# Patient Record
Sex: FEMALE | Race: WHITE | NOT HISPANIC OR LATINO
[De-identification: names, ages, dates, MRNs, and addresses within clinical notes are randomized per-mention and may not be internally consistent; named-entity substitution may affect disease eponyms.]

---

## 2022-07-22 ENCOUNTER — APPOINTMENT (OUTPATIENT)
Dept: ANTEPARTUM | Facility: CLINIC | Age: 27
End: 2022-07-22

## 2022-07-22 ENCOUNTER — ASOB RESULT (OUTPATIENT)
Age: 27
End: 2022-07-22

## 2022-07-22 PROCEDURE — 76813 OB US NUCHAL MEAS 1 GEST: CPT

## 2022-07-22 PROCEDURE — 76801 OB US < 14 WKS SINGLE FETUS: CPT

## 2022-08-11 PROBLEM — Z00.00 ENCOUNTER FOR PREVENTIVE HEALTH EXAMINATION: Status: ACTIVE | Noted: 2022-08-11

## 2022-09-16 ENCOUNTER — APPOINTMENT (OUTPATIENT)
Dept: ANTEPARTUM | Facility: CLINIC | Age: 27
End: 2022-09-16

## 2022-09-16 ENCOUNTER — ASOB RESULT (OUTPATIENT)
Age: 27
End: 2022-09-16

## 2022-09-16 PROCEDURE — 76817 TRANSVAGINAL US OBSTETRIC: CPT

## 2022-09-16 PROCEDURE — 76805 OB US >/= 14 WKS SNGL FETUS: CPT

## 2022-09-30 ENCOUNTER — APPOINTMENT (OUTPATIENT)
Dept: ANTEPARTUM | Facility: CLINIC | Age: 27
End: 2022-09-30

## 2022-09-30 ENCOUNTER — ASOB RESULT (OUTPATIENT)
Age: 27
End: 2022-09-30

## 2022-09-30 PROCEDURE — 76816 OB US FOLLOW-UP PER FETUS: CPT

## 2022-10-07 ENCOUNTER — APPOINTMENT (OUTPATIENT)
Dept: ANTEPARTUM | Facility: CLINIC | Age: 27
End: 2022-10-07

## 2022-10-20 ENCOUNTER — ASOB RESULT (OUTPATIENT)
Age: 27
End: 2022-10-20

## 2022-10-20 ENCOUNTER — APPOINTMENT (OUTPATIENT)
Dept: ANTEPARTUM | Facility: CLINIC | Age: 27
End: 2022-10-20

## 2022-10-20 PROCEDURE — 76815 OB US LIMITED FETUS(S): CPT

## 2022-11-23 ENCOUNTER — APPOINTMENT (OUTPATIENT)
Dept: ANTEPARTUM | Facility: CLINIC | Age: 27
End: 2022-11-23

## 2022-11-23 ENCOUNTER — ASOB RESULT (OUTPATIENT)
Age: 27
End: 2022-11-23

## 2022-11-23 PROCEDURE — 76820 UMBILICAL ARTERY ECHO: CPT | Mod: 59

## 2022-11-23 PROCEDURE — 76816 OB US FOLLOW-UP PER FETUS: CPT

## 2022-11-23 PROCEDURE — 76819 FETAL BIOPHYS PROFIL W/O NST: CPT

## 2022-12-07 ENCOUNTER — APPOINTMENT (OUTPATIENT)
Dept: ANTEPARTUM | Facility: CLINIC | Age: 27
End: 2022-12-07

## 2022-12-07 ENCOUNTER — ASOB RESULT (OUTPATIENT)
Age: 27
End: 2022-12-07

## 2022-12-07 PROCEDURE — 76820 UMBILICAL ARTERY ECHO: CPT | Mod: 59

## 2022-12-07 PROCEDURE — 76816 OB US FOLLOW-UP PER FETUS: CPT

## 2022-12-07 PROCEDURE — 76819 FETAL BIOPHYS PROFIL W/O NST: CPT

## 2022-12-14 ENCOUNTER — APPOINTMENT (OUTPATIENT)
Dept: ANTEPARTUM | Facility: CLINIC | Age: 27
End: 2022-12-14

## 2022-12-14 ENCOUNTER — ASOB RESULT (OUTPATIENT)
Age: 27
End: 2022-12-14

## 2022-12-14 PROCEDURE — 76819 FETAL BIOPHYS PROFIL W/O NST: CPT

## 2022-12-14 PROCEDURE — 76820 UMBILICAL ARTERY ECHO: CPT

## 2022-12-21 ENCOUNTER — ASOB RESULT (OUTPATIENT)
Age: 27
End: 2022-12-21

## 2022-12-21 ENCOUNTER — APPOINTMENT (OUTPATIENT)
Dept: ANTEPARTUM | Facility: CLINIC | Age: 27
End: 2022-12-21

## 2022-12-21 PROCEDURE — 76816 OB US FOLLOW-UP PER FETUS: CPT

## 2022-12-21 PROCEDURE — 76820 UMBILICAL ARTERY ECHO: CPT | Mod: 59

## 2022-12-21 PROCEDURE — 76819 FETAL BIOPHYS PROFIL W/O NST: CPT

## 2022-12-29 ENCOUNTER — APPOINTMENT (OUTPATIENT)
Dept: ANTEPARTUM | Facility: CLINIC | Age: 27
End: 2022-12-29
Payer: COMMERCIAL

## 2022-12-29 ENCOUNTER — ASOB RESULT (OUTPATIENT)
Age: 27
End: 2022-12-29

## 2022-12-29 PROCEDURE — 76820 UMBILICAL ARTERY ECHO: CPT

## 2022-12-29 PROCEDURE — 76819 FETAL BIOPHYS PROFIL W/O NST: CPT

## 2023-01-03 ENCOUNTER — ASOB RESULT (OUTPATIENT)
Age: 28
End: 2023-01-03

## 2023-01-03 ENCOUNTER — APPOINTMENT (OUTPATIENT)
Dept: ANTEPARTUM | Facility: CLINIC | Age: 28
End: 2023-01-03
Payer: COMMERCIAL

## 2023-01-03 PROCEDURE — 76820 UMBILICAL ARTERY ECHO: CPT | Mod: 59

## 2023-01-03 PROCEDURE — 76818 FETAL BIOPHYS PROFILE W/NST: CPT

## 2023-01-03 PROCEDURE — 76816 OB US FOLLOW-UP PER FETUS: CPT

## 2023-01-05 ENCOUNTER — ASOB RESULT (OUTPATIENT)
Age: 28
End: 2023-01-05

## 2023-01-05 ENCOUNTER — APPOINTMENT (OUTPATIENT)
Dept: ANTEPARTUM | Facility: CLINIC | Age: 28
End: 2023-01-05
Payer: COMMERCIAL

## 2023-01-05 PROCEDURE — 76820 UMBILICAL ARTERY ECHO: CPT

## 2023-01-05 PROCEDURE — 76818 FETAL BIOPHYS PROFILE W/NST: CPT

## 2023-01-11 ENCOUNTER — ASOB RESULT (OUTPATIENT)
Age: 28
End: 2023-01-11

## 2023-01-11 ENCOUNTER — APPOINTMENT (OUTPATIENT)
Dept: ANTEPARTUM | Facility: CLINIC | Age: 28
End: 2023-01-11
Payer: COMMERCIAL

## 2023-01-11 PROCEDURE — 76818 FETAL BIOPHYS PROFILE W/NST: CPT

## 2023-01-11 PROCEDURE — 76820 UMBILICAL ARTERY ECHO: CPT

## 2023-01-13 ENCOUNTER — APPOINTMENT (OUTPATIENT)
Dept: ANTEPARTUM | Facility: CLINIC | Age: 28
End: 2023-01-13

## 2023-01-13 ENCOUNTER — OUTPATIENT (OUTPATIENT)
Dept: OUTPATIENT SERVICES | Facility: HOSPITAL | Age: 28
LOS: 1 days | End: 2023-01-13
Payer: COMMERCIAL

## 2023-01-13 DIAGNOSIS — Z01.818 ENCOUNTER FOR OTHER PREPROCEDURAL EXAMINATION: ICD-10-CM

## 2023-01-13 LAB
ANION GAP SERPL CALC-SCNC: 10 MMOL/L — SIGNIFICANT CHANGE UP (ref 5–17)
APTT BLD: 27.3 SEC — LOW (ref 27.5–35.5)
BASOPHILS # BLD AUTO: 0.02 K/UL — SIGNIFICANT CHANGE UP (ref 0–0.2)
BASOPHILS NFR BLD AUTO: 0.2 % — SIGNIFICANT CHANGE UP (ref 0–2)
BLD GP AB SCN SERPL QL: NEGATIVE — SIGNIFICANT CHANGE UP
BLD GP AB SCN SERPL QL: NEGATIVE — SIGNIFICANT CHANGE UP
BUN SERPL-MCNC: 7 MG/DL — SIGNIFICANT CHANGE UP (ref 7–23)
CALCIUM SERPL-MCNC: 9.4 MG/DL — SIGNIFICANT CHANGE UP (ref 8.4–10.5)
CHLORIDE SERPL-SCNC: 102 MMOL/L — SIGNIFICANT CHANGE UP (ref 96–108)
CO2 SERPL-SCNC: 24 MMOL/L — SIGNIFICANT CHANGE UP (ref 22–31)
CREAT SERPL-MCNC: 0.53 MG/DL — SIGNIFICANT CHANGE UP (ref 0.5–1.3)
EGFR: 130 ML/MIN/1.73M2 — SIGNIFICANT CHANGE UP
EOSINOPHIL # BLD AUTO: 0.08 K/UL — SIGNIFICANT CHANGE UP (ref 0–0.5)
EOSINOPHIL NFR BLD AUTO: 0.7 % — SIGNIFICANT CHANGE UP (ref 0–6)
GLUCOSE SERPL-MCNC: 131 MG/DL — HIGH (ref 70–99)
HCT VFR BLD CALC: 36.1 % — SIGNIFICANT CHANGE UP (ref 34.5–45)
HGB BLD-MCNC: 11.8 G/DL — SIGNIFICANT CHANGE UP (ref 11.5–15.5)
IMM GRANULOCYTES NFR BLD AUTO: 0.4 % — SIGNIFICANT CHANGE UP (ref 0–0.9)
INR BLD: 0.96 — SIGNIFICANT CHANGE UP (ref 0.88–1.16)
LYMPHOCYTES # BLD AUTO: 27.5 % — SIGNIFICANT CHANGE UP (ref 13–44)
LYMPHOCYTES # BLD AUTO: 3.26 K/UL — SIGNIFICANT CHANGE UP (ref 1–3.3)
MCHC RBC-ENTMCNC: 28.8 PG — SIGNIFICANT CHANGE UP (ref 27–34)
MCHC RBC-ENTMCNC: 32.7 GM/DL — SIGNIFICANT CHANGE UP (ref 32–36)
MCV RBC AUTO: 88 FL — SIGNIFICANT CHANGE UP (ref 80–100)
MONOCYTES # BLD AUTO: 0.53 K/UL — SIGNIFICANT CHANGE UP (ref 0–0.9)
MONOCYTES NFR BLD AUTO: 4.5 % — SIGNIFICANT CHANGE UP (ref 2–14)
NEUTROPHILS # BLD AUTO: 7.92 K/UL — HIGH (ref 1.8–7.4)
NEUTROPHILS NFR BLD AUTO: 66.7 % — SIGNIFICANT CHANGE UP (ref 43–77)
NRBC # BLD: 0 /100 WBCS — SIGNIFICANT CHANGE UP (ref 0–0)
PLATELET # BLD AUTO: 272 K/UL — SIGNIFICANT CHANGE UP (ref 150–400)
POTASSIUM SERPL-MCNC: 4.4 MMOL/L — SIGNIFICANT CHANGE UP (ref 3.5–5.3)
POTASSIUM SERPL-SCNC: 4.4 MMOL/L — SIGNIFICANT CHANGE UP (ref 3.5–5.3)
PROTHROM AB SERPL-ACNC: 11.4 SEC — SIGNIFICANT CHANGE UP (ref 10.5–13.4)
RBC # BLD: 4.1 M/UL — SIGNIFICANT CHANGE UP (ref 3.8–5.2)
RBC # FLD: 14.5 % — SIGNIFICANT CHANGE UP (ref 10.3–14.5)
RH IG SCN BLD-IMP: POSITIVE — SIGNIFICANT CHANGE UP
RH IG SCN BLD-IMP: POSITIVE — SIGNIFICANT CHANGE UP
SODIUM SERPL-SCNC: 136 MMOL/L — SIGNIFICANT CHANGE UP (ref 135–145)
WBC # BLD: 11.86 K/UL — HIGH (ref 3.8–10.5)
WBC # FLD AUTO: 11.86 K/UL — HIGH (ref 3.8–10.5)

## 2023-01-13 PROCEDURE — 85025 COMPLETE CBC W/AUTO DIFF WBC: CPT

## 2023-01-13 PROCEDURE — 85610 PROTHROMBIN TIME: CPT

## 2023-01-13 PROCEDURE — 80048 BASIC METABOLIC PNL TOTAL CA: CPT

## 2023-01-13 PROCEDURE — 86900 BLOOD TYPING SEROLOGIC ABO: CPT

## 2023-01-13 PROCEDURE — 86850 RBC ANTIBODY SCREEN: CPT

## 2023-01-13 PROCEDURE — 86901 BLOOD TYPING SEROLOGIC RH(D): CPT

## 2023-01-13 PROCEDURE — 85730 THROMBOPLASTIN TIME PARTIAL: CPT

## 2023-01-15 ENCOUNTER — TRANSCRIPTION ENCOUNTER (OUTPATIENT)
Age: 28
End: 2023-01-15

## 2023-01-16 ENCOUNTER — INPATIENT (INPATIENT)
Facility: HOSPITAL | Age: 28
LOS: 3 days | Discharge: HOME | End: 2023-01-20
Attending: SPECIALIST | Admitting: SPECIALIST
Payer: COMMERCIAL

## 2023-01-16 VITALS — WEIGHT: 184.97 LBS | HEIGHT: 59 IN

## 2023-01-16 LAB
ALBUMIN SERPL ELPH-MCNC: 2.7 G/DL — LOW (ref 3.3–5)
ALP SERPL-CCNC: 75 U/L — SIGNIFICANT CHANGE UP (ref 40–120)
ALT FLD-CCNC: 8 U/L — LOW (ref 10–45)
ANION GAP SERPL CALC-SCNC: 13 MMOL/L — SIGNIFICANT CHANGE UP (ref 5–17)
AST SERPL-CCNC: 23 U/L — SIGNIFICANT CHANGE UP (ref 10–40)
BILIRUB SERPL-MCNC: <0.2 MG/DL — SIGNIFICANT CHANGE UP (ref 0.2–1.2)
BLD GP AB SCN SERPL QL: NEGATIVE — SIGNIFICANT CHANGE UP
BUN SERPL-MCNC: 6 MG/DL — LOW (ref 7–23)
CALCIUM SERPL-MCNC: 8.4 MG/DL — SIGNIFICANT CHANGE UP (ref 8.4–10.5)
CHLORIDE SERPL-SCNC: 103 MMOL/L — SIGNIFICANT CHANGE UP (ref 96–108)
CO2 SERPL-SCNC: 20 MMOL/L — LOW (ref 22–31)
CREAT ?TM UR-MCNC: 224 MG/DL — SIGNIFICANT CHANGE UP
CREAT SERPL-MCNC: 0.43 MG/DL — LOW (ref 0.5–1.3)
EGFR: 137 ML/MIN/1.73M2 — SIGNIFICANT CHANGE UP
GLUCOSE BLDC GLUCOMTR-MCNC: 100 MG/DL — HIGH (ref 70–99)
GLUCOSE SERPL-MCNC: 121 MG/DL — HIGH (ref 70–99)
LDH SERPL L TO P-CCNC: 272 U/L — HIGH (ref 50–242)
POTASSIUM SERPL-MCNC: 4.5 MMOL/L — SIGNIFICANT CHANGE UP (ref 3.5–5.3)
POTASSIUM SERPL-SCNC: 4.5 MMOL/L — SIGNIFICANT CHANGE UP (ref 3.5–5.3)
PROT ?TM UR-MCNC: 106 MG/DL — HIGH (ref 0–12)
PROT SERPL-MCNC: 4.8 G/DL — LOW (ref 6–8.3)
PROT/CREAT UR-RTO: 0.5 RATIO — HIGH (ref 0–0.2)
RH IG SCN BLD-IMP: POSITIVE — SIGNIFICANT CHANGE UP
SODIUM SERPL-SCNC: 136 MMOL/L — SIGNIFICANT CHANGE UP (ref 135–145)
URATE SERPL-MCNC: 4.1 MG/DL — SIGNIFICANT CHANGE UP (ref 2.5–7)

## 2023-01-16 PROCEDURE — 88307 TISSUE EXAM BY PATHOLOGIST: CPT | Mod: 26

## 2023-01-16 PROCEDURE — 59514 CESAREAN DELIVERY ONLY: CPT | Mod: AS

## 2023-01-16 RX ORDER — KETOROLAC TROMETHAMINE 30 MG/ML
30 SYRINGE (ML) INJECTION EVERY 6 HOURS
Refills: 0 | Status: DISCONTINUED | OUTPATIENT
Start: 2023-01-16 | End: 2023-01-16

## 2023-01-16 RX ORDER — SODIUM CHLORIDE 9 MG/ML
500 INJECTION, SOLUTION INTRAVENOUS ONCE
Refills: 0 | Status: COMPLETED | OUTPATIENT
Start: 2023-01-16 | End: 2023-01-16

## 2023-01-16 RX ORDER — SIMETHICONE 80 MG/1
80 TABLET, CHEWABLE ORAL EVERY 4 HOURS
Refills: 0 | Status: DISCONTINUED | OUTPATIENT
Start: 2023-01-16 | End: 2023-01-20

## 2023-01-16 RX ORDER — CITRIC ACID/SODIUM CITRATE 300-500 MG
30 SOLUTION, ORAL ORAL ONCE
Refills: 0 | Status: COMPLETED | OUTPATIENT
Start: 2023-01-16 | End: 2023-01-16

## 2023-01-16 RX ORDER — ENOXAPARIN SODIUM 100 MG/ML
40 INJECTION SUBCUTANEOUS EVERY 24 HOURS
Refills: 0 | Status: DISCONTINUED | OUTPATIENT
Start: 2023-01-17 | End: 2023-01-20

## 2023-01-16 RX ORDER — CEFAZOLIN SODIUM 1 G
2000 VIAL (EA) INJECTION ONCE
Refills: 0 | Status: COMPLETED | OUTPATIENT
Start: 2023-01-16 | End: 2023-01-16

## 2023-01-16 RX ORDER — OXYTOCIN 10 UNIT/ML
333.33 VIAL (ML) INJECTION
Qty: 20 | Refills: 0 | Status: DISCONTINUED | OUTPATIENT
Start: 2023-01-16 | End: 2023-01-16

## 2023-01-16 RX ORDER — SODIUM CHLORIDE 9 MG/ML
1000 INJECTION, SOLUTION INTRAVENOUS
Refills: 0 | Status: DISCONTINUED | OUTPATIENT
Start: 2023-01-16 | End: 2023-01-16

## 2023-01-16 RX ORDER — OXYTOCIN 10 UNIT/ML
333.33 VIAL (ML) INJECTION
Qty: 20 | Refills: 0 | Status: DISCONTINUED | OUTPATIENT
Start: 2023-01-16 | End: 2023-01-20

## 2023-01-16 RX ORDER — TETANUS TOXOID, REDUCED DIPHTHERIA TOXOID AND ACELLULAR PERTUSSIS VACCINE, ADSORBED 5; 2.5; 8; 8; 2.5 [IU]/.5ML; [IU]/.5ML; UG/.5ML; UG/.5ML; UG/.5ML
0.5 SUSPENSION INTRAMUSCULAR ONCE
Refills: 0 | Status: DISCONTINUED | OUTPATIENT
Start: 2023-01-16 | End: 2023-01-20

## 2023-01-16 RX ORDER — FAMOTIDINE 10 MG/ML
20 INJECTION INTRAVENOUS ONCE
Refills: 0 | Status: COMPLETED | OUTPATIENT
Start: 2023-01-16 | End: 2023-01-16

## 2023-01-16 RX ORDER — DIPHENHYDRAMINE HCL 50 MG
25 CAPSULE ORAL EVERY 6 HOURS
Refills: 0 | Status: DISCONTINUED | OUTPATIENT
Start: 2023-01-16 | End: 2023-01-20

## 2023-01-16 RX ORDER — ONDANSETRON 8 MG/1
4 TABLET, FILM COATED ORAL EVERY 6 HOURS
Refills: 0 | Status: DISCONTINUED | OUTPATIENT
Start: 2023-01-16 | End: 2023-01-16

## 2023-01-16 RX ORDER — INFLUENZA VIRUS VACCINE 15; 15; 15; 15 UG/.5ML; UG/.5ML; UG/.5ML; UG/.5ML
0.5 SUSPENSION INTRAMUSCULAR ONCE
Refills: 0 | Status: DISCONTINUED | OUTPATIENT
Start: 2023-01-16 | End: 2023-01-20

## 2023-01-16 RX ORDER — SODIUM CHLORIDE 9 MG/ML
1000 INJECTION, SOLUTION INTRAVENOUS
Refills: 0 | Status: DISCONTINUED | OUTPATIENT
Start: 2023-01-16 | End: 2023-01-20

## 2023-01-16 RX ORDER — IBUPROFEN 200 MG
600 TABLET ORAL EVERY 6 HOURS
Refills: 0 | Status: DISCONTINUED | OUTPATIENT
Start: 2023-01-16 | End: 2023-01-16

## 2023-01-16 RX ORDER — FAMOTIDINE 10 MG/ML
20 INJECTION INTRAVENOUS ONCE
Refills: 0 | Status: DISCONTINUED | OUTPATIENT
Start: 2023-01-16 | End: 2023-01-16

## 2023-01-16 RX ORDER — OXYCODONE HYDROCHLORIDE 5 MG/1
5 TABLET ORAL ONCE
Refills: 0 | Status: DISCONTINUED | OUTPATIENT
Start: 2023-01-16 | End: 2023-01-20

## 2023-01-16 RX ORDER — CITRIC ACID/SODIUM CITRATE 300-500 MG
30 SOLUTION, ORAL ORAL ONCE
Refills: 0 | Status: DISCONTINUED | OUTPATIENT
Start: 2023-01-16 | End: 2023-01-16

## 2023-01-16 RX ORDER — MAGNESIUM HYDROXIDE 400 MG/1
30 TABLET, CHEWABLE ORAL
Refills: 0 | Status: DISCONTINUED | OUTPATIENT
Start: 2023-01-16 | End: 2023-01-20

## 2023-01-16 RX ORDER — ACETAMINOPHEN 500 MG
1000 TABLET ORAL ONCE
Refills: 0 | Status: COMPLETED | OUTPATIENT
Start: 2023-01-16 | End: 2023-01-16

## 2023-01-16 RX ORDER — SODIUM CHLORIDE 9 MG/ML
1000 INJECTION, SOLUTION INTRAVENOUS ONCE
Refills: 0 | Status: DISCONTINUED | OUTPATIENT
Start: 2023-01-16 | End: 2023-01-16

## 2023-01-16 RX ORDER — NALOXONE HYDROCHLORIDE 4 MG/.1ML
0.1 SPRAY NASAL
Refills: 0 | Status: DISCONTINUED | OUTPATIENT
Start: 2023-01-16 | End: 2023-01-16

## 2023-01-16 RX ORDER — LANOLIN
1 OINTMENT (GRAM) TOPICAL EVERY 6 HOURS
Refills: 0 | Status: DISCONTINUED | OUTPATIENT
Start: 2023-01-16 | End: 2023-01-20

## 2023-01-16 RX ORDER — ACETAMINOPHEN 500 MG
975 TABLET ORAL
Refills: 0 | Status: DISCONTINUED | OUTPATIENT
Start: 2023-01-16 | End: 2023-01-20

## 2023-01-16 RX ORDER — DEXAMETHASONE 0.5 MG/5ML
4 ELIXIR ORAL EVERY 6 HOURS
Refills: 0 | Status: DISCONTINUED | OUTPATIENT
Start: 2023-01-16 | End: 2023-01-16

## 2023-01-16 RX ORDER — OXYCODONE HYDROCHLORIDE 5 MG/1
5 TABLET ORAL
Refills: 0 | Status: COMPLETED | OUTPATIENT
Start: 2023-01-16 | End: 2023-01-23

## 2023-01-16 RX ADMIN — FAMOTIDINE 20 MILLIGRAM(S): 10 INJECTION INTRAVENOUS at 07:29

## 2023-01-16 RX ADMIN — Medication 975 MILLIGRAM(S): at 16:30

## 2023-01-16 RX ADMIN — Medication 975 MILLIGRAM(S): at 20:48

## 2023-01-16 RX ADMIN — SIMETHICONE 80 MILLIGRAM(S): 80 TABLET, CHEWABLE ORAL at 23:17

## 2023-01-16 RX ADMIN — Medication 400 MILLIGRAM(S): at 10:23

## 2023-01-16 RX ADMIN — Medication 30 MILLILITER(S): at 07:29

## 2023-01-16 RX ADMIN — Medication 100 MILLIGRAM(S): at 07:30

## 2023-01-16 RX ADMIN — SODIUM CHLORIDE 500 MILLILITER(S): 9 INJECTION, SOLUTION INTRAVENOUS at 10:05

## 2023-01-16 RX ADMIN — Medication 975 MILLIGRAM(S): at 21:20

## 2023-01-16 RX ADMIN — Medication 975 MILLIGRAM(S): at 15:52

## 2023-01-16 RX ADMIN — Medication 1000 MILLIGRAM(S): at 11:15

## 2023-01-16 NOTE — PRE-ANESTHESIA EVALUATION ADULT - NSANTHRISKNONERD_GEN_ALL_CORE
[FreeTextEntry3] : AZIZA pushes up on elbows, good head control, rolled in office front to back, babbling, social smile, interactive\par 
No risk alerts present

## 2023-01-16 NOTE — PATIENT PROFILE OB - FALL HARM RISK - UNIVERSAL INTERVENTIONS
Bed in lowest position, wheels locked, appropriate side rails in place/Call bell, personal items and telephone in reach/Instruct patient to call for assistance before getting out of bed or chair/Non-slip footwear when patient is out of bed/[place] to call system/Physically safe environment - no spills, clutter or unnecessary equipment/Purposeful Proactive Rounding/Room/bathroom lighting operational, light cord in reach

## 2023-01-17 LAB
BASOPHILS # BLD AUTO: 0.03 K/UL — SIGNIFICANT CHANGE UP (ref 0–0.2)
BASOPHILS NFR BLD AUTO: 0.2 % — SIGNIFICANT CHANGE UP (ref 0–2)
EOSINOPHIL # BLD AUTO: 0.05 K/UL — SIGNIFICANT CHANGE UP (ref 0–0.5)
EOSINOPHIL NFR BLD AUTO: 0.4 % — SIGNIFICANT CHANGE UP (ref 0–6)
HCT VFR BLD CALC: 28.2 % — LOW (ref 34.5–45)
HGB BLD-MCNC: 9 G/DL — LOW (ref 11.5–15.5)
IMM GRANULOCYTES NFR BLD AUTO: 0.5 % — SIGNIFICANT CHANGE UP (ref 0–0.9)
LYMPHOCYTES # BLD AUTO: 2.91 K/UL — SIGNIFICANT CHANGE UP (ref 1–3.3)
LYMPHOCYTES # BLD AUTO: 21.6 % — SIGNIFICANT CHANGE UP (ref 13–44)
MCHC RBC-ENTMCNC: 28.5 PG — SIGNIFICANT CHANGE UP (ref 27–34)
MCHC RBC-ENTMCNC: 31.9 GM/DL — LOW (ref 32–36)
MCV RBC AUTO: 89.2 FL — SIGNIFICANT CHANGE UP (ref 80–100)
MONOCYTES # BLD AUTO: 0.67 K/UL — SIGNIFICANT CHANGE UP (ref 0–0.9)
MONOCYTES NFR BLD AUTO: 5 % — SIGNIFICANT CHANGE UP (ref 2–14)
NEUTROPHILS # BLD AUTO: 9.77 K/UL — HIGH (ref 1.8–7.4)
NEUTROPHILS NFR BLD AUTO: 72.3 % — SIGNIFICANT CHANGE UP (ref 43–77)
NRBC # BLD: 0 /100 WBCS — SIGNIFICANT CHANGE UP (ref 0–0)
PLATELET # BLD AUTO: 212 K/UL — SIGNIFICANT CHANGE UP (ref 150–400)
RBC # BLD: 3.16 M/UL — LOW (ref 3.8–5.2)
RBC # FLD: 14.6 % — HIGH (ref 10.3–14.5)
WBC # BLD: 13.5 K/UL — HIGH (ref 3.8–10.5)
WBC # FLD AUTO: 13.5 K/UL — HIGH (ref 3.8–10.5)

## 2023-01-17 RX ORDER — SENNA PLUS 8.6 MG/1
1 TABLET ORAL DAILY
Refills: 0 | Status: DISCONTINUED | OUTPATIENT
Start: 2023-01-17 | End: 2023-01-20

## 2023-01-17 RX ORDER — OXYCODONE HYDROCHLORIDE 5 MG/1
5 TABLET ORAL
Refills: 0 | Status: DISCONTINUED | OUTPATIENT
Start: 2023-01-17 | End: 2023-01-20

## 2023-01-17 RX ADMIN — OXYCODONE HYDROCHLORIDE 5 MILLIGRAM(S): 5 TABLET ORAL at 19:00

## 2023-01-17 RX ADMIN — Medication 975 MILLIGRAM(S): at 03:00

## 2023-01-17 RX ADMIN — Medication 975 MILLIGRAM(S): at 10:00

## 2023-01-17 RX ADMIN — SIMETHICONE 80 MILLIGRAM(S): 80 TABLET, CHEWABLE ORAL at 12:32

## 2023-01-17 RX ADMIN — SIMETHICONE 80 MILLIGRAM(S): 80 TABLET, CHEWABLE ORAL at 06:43

## 2023-01-17 RX ADMIN — Medication 975 MILLIGRAM(S): at 21:31

## 2023-01-17 RX ADMIN — OXYCODONE HYDROCHLORIDE 5 MILLIGRAM(S): 5 TABLET ORAL at 13:21

## 2023-01-17 RX ADMIN — OXYCODONE HYDROCHLORIDE 5 MILLIGRAM(S): 5 TABLET ORAL at 03:30

## 2023-01-17 RX ADMIN — OXYCODONE HYDROCHLORIDE 5 MILLIGRAM(S): 5 TABLET ORAL at 18:13

## 2023-01-17 RX ADMIN — OXYCODONE HYDROCHLORIDE 5 MILLIGRAM(S): 5 TABLET ORAL at 06:39

## 2023-01-17 RX ADMIN — SENNA PLUS 1 TABLET(S): 8.6 TABLET ORAL at 23:28

## 2023-01-17 RX ADMIN — Medication 975 MILLIGRAM(S): at 16:00

## 2023-01-17 RX ADMIN — Medication 975 MILLIGRAM(S): at 15:25

## 2023-01-17 RX ADMIN — Medication 975 MILLIGRAM(S): at 22:05

## 2023-01-17 RX ADMIN — OXYCODONE HYDROCHLORIDE 5 MILLIGRAM(S): 5 TABLET ORAL at 07:14

## 2023-01-17 RX ADMIN — Medication 975 MILLIGRAM(S): at 09:23

## 2023-01-17 RX ADMIN — OXYCODONE HYDROCHLORIDE 5 MILLIGRAM(S): 5 TABLET ORAL at 23:28

## 2023-01-17 RX ADMIN — OXYCODONE HYDROCHLORIDE 5 MILLIGRAM(S): 5 TABLET ORAL at 04:00

## 2023-01-17 RX ADMIN — Medication 975 MILLIGRAM(S): at 02:23

## 2023-01-17 RX ADMIN — OXYCODONE HYDROCHLORIDE 5 MILLIGRAM(S): 5 TABLET ORAL at 12:24

## 2023-01-17 RX ADMIN — SIMETHICONE 80 MILLIGRAM(S): 80 TABLET, CHEWABLE ORAL at 18:12

## 2023-01-17 RX ADMIN — ENOXAPARIN SODIUM 40 MILLIGRAM(S): 100 INJECTION SUBCUTANEOUS at 09:23

## 2023-01-17 NOTE — PROGRESS NOTE ADULT - SUBJECTIVE AND OBJECTIVE BOX
Patient evaluated at bedside this morning on POD#1, resting comfortable in bed with no acute events overnight.  She reports pain is well controlled with acetaminophen and IV Toradol.  She denies headache, dizziness, chest pain, palpitations, shortness of breath, nausea, vomiting, or heavy vaginal bleeding.  She has ambulated to the bathroom and is voiding spontaneously.  Tolerating clear liquids, not passing flatus.     Physical Exam:  Vital Signs Last 24 Hrs  T(C): 36.8 (17 Jan 2023 06:32), Max: 37.1 (16 Jan 2023 22:18)  T(F): 98.2 (17 Jan 2023 06:32), Max: 98.8 (16 Jan 2023 22:18)  HR: 103 (17 Jan 2023 06:32) (79 - 103)  BP: 123/74 (17 Jan 2023 06:32) (105/63 - 174/82)  BP(mean): --  RR: 18 (17 Jan 2023 06:32) (18 - 18)  SpO2: 99% (17 Jan 2023 06:32) (98% - 99%)    Parameters below as of 17 Jan 2023 06:32  Patient On (Oxygen Delivery Method): room air        GA: comfortable in NAD  Abd: soft, nontender, nondistended, no rebound or guarding, incision clean, dry and intact, uterus firm at midline, 1 fb below umbilicus  : lochia WNL  Extremities: no swelling or calf tenderness                            9.0    13.50 )-----------( 212      ( 17 Jan 2023 05:30 )             28.2     01-16    136  |  103  |  6<L>  ----------------------------<  121<H>  4.5   |  20<L>  |  0.43<L>    Ca    8.4      16 Jan 2023 19:19    TPro  4.8<L>  /  Alb  2.7<L>  /  TBili  <0.2  /  DBili  x   /  AST  23  /  ALT  8<L>  /  AlkPhos  75  01-16        acetaminophen     Tablet .. 975 milliGRAM(s) Oral <User Schedule>  diphenhydrAMINE 25 milliGRAM(s) Oral every 6 hours PRN  diphtheria/tetanus/pertussis (acellular) Vaccine (Adacel) 0.5 milliLiter(s) IntraMuscular once  enoxaparin Injectable 40 milliGRAM(s) SubCutaneous every 24 hours  influenza   Vaccine 0.5 milliLiter(s) IntraMuscular once  lactated ringers. 1000 milliLiter(s) IV Continuous <Continuous>  lanolin Ointment 1 Application(s) Topical every 6 hours PRN  magnesium hydroxide Suspension 30 milliLiter(s) Oral two times a day PRN  oxyCODONE    IR 5 milliGRAM(s) Oral once PRN  oxyCODONE    IR 5 milliGRAM(s) Oral every 3 hours PRN  oxytocin Infusion 333.333 milliUNIT(s)/Min IV Continuous <Continuous>  simethicone 80 milliGRAM(s) Chew every 4 hours PRN

## 2023-01-17 NOTE — LACTATION INITIAL EVALUATION - NS LACT CON REASON FOR REQ
Dyad seen at 16hrs post birth. GDM, blood glucose level WNL. Demonstrate hand express colostrum, bilateral expressible colostrum present. Place infant on Rt. breast with football hold. Infant latched with widely open flanged mouth and sucking sustained without nipple pain. Discuss how to maintain and increase breast milk supply and benefits of exclusively breastfeeding. Mom comfortable with positioning and latching. Discuss with Primary RN/primaparous mom

## 2023-01-17 NOTE — LACTATION INITIAL EVALUATION - LACTATION INTERVENTIONS
Consult discussed with primary RN/initiate/review safe skin-to-skin/initiate/review hand expression/initiate/review pumping guidelines and safe milk handling/initiate/review techniques for position and latch/post discharge community resources provided/review techniques to increase milk supply/initiate/review finger suck/initiate/review breast massage/compression/initiate/review alternate feeding method/reviewed components of an effective feeding and at least 8 effective feedings per day required/reviewed importance of monitoring infant diapers, the breastfeeding log, and minimum output each day/reviewed risks of unnecessary formula supplementation/reviewed risks of artificial nipples/reviewed strategies to transition to breastfeeding only/reviewed benefits and recommendations for rooming in/recommended follow-up with pediatrician within 24 hours of discharge/reviewed indications of inadequate milk transfer that would require supplementation

## 2023-01-17 NOTE — PROGRESS NOTE ADULT - ASSESSMENT
A/P: 27y  s/p  section, POD #1, c/b siPEC without severe features  -  siPEC: normotensive, asymptomatic, continue to monitor BPs  -  Pain: motrin/acetaminophen, oxycodone for severe pain PRN  -  Post-operatively labs: post-op Hgb stable, no dizziness of sxs of anemia  -  GI: tolerating clears, ADAT  -  : voiding spontaneously  -  DVT prophylaxis: ambulation, SCDs, Lovenox  -  Dispo: POD 3 or 4

## 2023-01-18 PROCEDURE — 99221 1ST HOSP IP/OBS SF/LOW 40: CPT

## 2023-01-18 PROCEDURE — 93970 EXTREMITY STUDY: CPT | Mod: 26

## 2023-01-18 RX ORDER — LABETALOL HCL 100 MG
100 TABLET ORAL
Refills: 0 | Status: DISCONTINUED | OUTPATIENT
Start: 2023-01-18 | End: 2023-01-19

## 2023-01-18 RX ADMIN — OXYCODONE HYDROCHLORIDE 5 MILLIGRAM(S): 5 TABLET ORAL at 23:24

## 2023-01-18 RX ADMIN — SENNA PLUS 1 TABLET(S): 8.6 TABLET ORAL at 14:36

## 2023-01-18 RX ADMIN — SIMETHICONE 80 MILLIGRAM(S): 80 TABLET, CHEWABLE ORAL at 09:49

## 2023-01-18 RX ADMIN — ENOXAPARIN SODIUM 40 MILLIGRAM(S): 100 INJECTION SUBCUTANEOUS at 09:48

## 2023-01-18 RX ADMIN — Medication 975 MILLIGRAM(S): at 14:34

## 2023-01-18 RX ADMIN — SIMETHICONE 80 MILLIGRAM(S): 80 TABLET, CHEWABLE ORAL at 19:07

## 2023-01-18 RX ADMIN — Medication 975 MILLIGRAM(S): at 09:48

## 2023-01-18 RX ADMIN — MAGNESIUM HYDROXIDE 30 MILLILITER(S): 400 TABLET, CHEWABLE ORAL at 09:49

## 2023-01-18 RX ADMIN — Medication 975 MILLIGRAM(S): at 15:04

## 2023-01-18 RX ADMIN — OXYCODONE HYDROCHLORIDE 5 MILLIGRAM(S): 5 TABLET ORAL at 06:20

## 2023-01-18 RX ADMIN — SIMETHICONE 80 MILLIGRAM(S): 80 TABLET, CHEWABLE ORAL at 23:35

## 2023-01-18 RX ADMIN — Medication 975 MILLIGRAM(S): at 02:50

## 2023-01-18 RX ADMIN — OXYCODONE HYDROCHLORIDE 5 MILLIGRAM(S): 5 TABLET ORAL at 19:04

## 2023-01-18 RX ADMIN — Medication 100 MILLIGRAM(S): at 20:52

## 2023-01-18 RX ADMIN — OXYCODONE HYDROCHLORIDE 5 MILLIGRAM(S): 5 TABLET ORAL at 09:48

## 2023-01-18 RX ADMIN — Medication 100 MILLIGRAM(S): at 10:38

## 2023-01-18 RX ADMIN — OXYCODONE HYDROCHLORIDE 5 MILLIGRAM(S): 5 TABLET ORAL at 15:18

## 2023-01-18 RX ADMIN — OXYCODONE HYDROCHLORIDE 5 MILLIGRAM(S): 5 TABLET ORAL at 14:35

## 2023-01-18 RX ADMIN — OXYCODONE HYDROCHLORIDE 5 MILLIGRAM(S): 5 TABLET ORAL at 06:50

## 2023-01-18 RX ADMIN — Medication 975 MILLIGRAM(S): at 21:25

## 2023-01-18 RX ADMIN — Medication 975 MILLIGRAM(S): at 02:17

## 2023-01-18 RX ADMIN — OXYCODONE HYDROCHLORIDE 5 MILLIGRAM(S): 5 TABLET ORAL at 10:18

## 2023-01-18 RX ADMIN — OXYCODONE HYDROCHLORIDE 5 MILLIGRAM(S): 5 TABLET ORAL at 00:00

## 2023-01-18 RX ADMIN — Medication 975 MILLIGRAM(S): at 10:18

## 2023-01-18 RX ADMIN — Medication 975 MILLIGRAM(S): at 20:52

## 2023-01-18 NOTE — CONSULT NOTE ADULT - ASSESSMENT
ASSESSMENT:  27F  Hx of single kidney, recently diagnosed with hypertension and gestational diabetes during pregnancy presented for , course complicated by superimposed preeclampsia without severe features. Cardiology consulted in setting of sinus tachycardia and pre-eclampsia.       PLAN:  Currently asymptomatic. EKG sinus tachycardia rate up to 108. Euvolemic on exam.  - Started on labetolol 100mg bid, continue and monitor response  - Obtain nonurgent TTE  - Pain control per primary team  - Monitor electrolytes to maintain K>4 and Mg>2      Discussed with Dr. Escalona, consult attending.

## 2023-01-18 NOTE — PROGRESS NOTE ADULT - SUBJECTIVE AND OBJECTIVE BOX
Patient evaluated at bedside this morning on POD#2, resting comfortable in bed.   She reports pain is well controlled with acetaminophen and ibuprofen.  She denies headache, dizziness, chest pain, palpitations, shortness of breath, nausea, vomiting, or heavy vaginal bleeding.  She has been ambulating without assistance, voiding spontaneously, passing gas, tolerating regular diet, and is breastfeeding.    Physical Exam:  Vital Signs Last 24 Hrs  T(C): 37 (18 Jan 2023 06:42), Max: 37.6 (18 Jan 2023 02:05)  T(F): 98.6 (18 Jan 2023 06:42), Max: 99.7 (18 Jan 2023 02:05)  HR: 114 (18 Jan 2023 06:42) (102 - 139)  BP: 143/88 (18 Jan 2023 06:42) (128/80 - 152/96)  BP(mean): --  RR: 18 (18 Jan 2023 06:42) (18 - 19)  SpO2: 99% (18 Jan 2023 06:42) (97% - 99%)    Parameters below as of 18 Jan 2023 06:42  Patient On (Oxygen Delivery Method): room air        GA: comfortable in NAD  Abd: soft, nontender, nondistended, no rebound or guarding, incision clean, dry and intact, uterus firm at midline, 2 fb below umbilicus  : lochia WNL  Extremities: no swelling or calf tenderness                             9.0    13.50 )-----------( 212      ( 17 Jan 2023 05:30 )             28.2     01-16    136  |  103  |  6<L>  ----------------------------<  121<H>  4.5   |  20<L>  |  0.43<L>    Ca    8.4      16 Jan 2023 19:19    TPro  4.8<L>  /  Alb  2.7<L>  /  TBili  <0.2  /  DBili  x   /  AST  23  /  ALT  8<L>  /  AlkPhos  75  01-16      EKG (1/18): sinus tachycardia

## 2023-01-18 NOTE — CONSULT NOTE ADULT - SUBJECTIVE AND OBJECTIVE BOX
Cardiology Consult Note    HPI: 27F  Hx of single kidney, recently diagnosed with hypertension and gestational diabetes during pregnancy presented for , course complicated by superimposed preeclampsia without severe features, noted proteinuria, borderline elevated BP, also noted to have sinus tachycardia. Patient states has not followed with PCP prior to pregnancy but told BP elevated to 140s during pregnancy, not requiring medications. Also noted to have gestational diabetes, that was diet controlled. Patient denies symptoms of headaches, dizziness, no palpitations, no chest pain or shortness of breath.     Meds: ASA during pregnancy  Social: Denies tobacco, etoh or recreational drugs  FHx: Father- LOGAN in 50s    PAST MEDICAL & SURGICAL HISTORY:      MEDICATIONS  (STANDING):  acetaminophen     Tablet .. 975 milliGRAM(s) Oral <User Schedule>  diphtheria/tetanus/pertussis (acellular) Vaccine (Adacel) 0.5 milliLiter(s) IntraMuscular once  enoxaparin Injectable 40 milliGRAM(s) SubCutaneous every 24 hours  influenza   Vaccine 0.5 milliLiter(s) IntraMuscular once  labetalol 100 milliGRAM(s) Oral two times a day  lactated ringers. 1000 milliLiter(s) (125 mL/Hr) IV Continuous <Continuous>  oxytocin Infusion 333.333 milliUNIT(s)/Min (1000 mL/Hr) IV Continuous <Continuous>  senna 1 Tablet(s) Oral daily    MEDICATIONS  (PRN):  diphenhydrAMINE 25 milliGRAM(s) Oral every 6 hours PRN Pruritus  lanolin Ointment 1 Application(s) Topical every 6 hours PRN Sore Nipples  magnesium hydroxide Suspension 30 milliLiter(s) Oral two times a day PRN Constipation  oxyCODONE    IR 5 milliGRAM(s) Oral every 3 hours PRN Moderate to Severe Pain (4-10)  oxyCODONE    IR 5 milliGRAM(s) Oral once PRN Moderate to Severe Pain (4-10)  simethicone 80 milliGRAM(s) Chew every 4 hours PRN Gas    Vital Signs Last 24 Hrs  T(C): 37.2 (2023 14:00), Max: 37.6 (2023 02:05)  T(F): 99 (2023 14:00), Max: 99.7 (2023 02:05)  HR: 119 (2023 14:00) (102 - 139)  BP: 138/87 (2023 14:00) (128/81 - 152/96)  BP(mean): --  RR: 16 (2023 14:00) (16 - 19)  SpO2: 98% (2023 14:00) (95% - 99%)    Parameters below as of 2023 14:00  Patient On (Oxygen Delivery Method): room air        PHYSICAL EXAM:  GEN: Awake, alert. NAD.   HEENT:  JVP normal  RESP: CTA b/l  CV: RRR. Normal S1/S2. No m/r/g.  EXT: Warm. No edema  NEURO: AAOx3. No focal deficits.     LABS:                        9.0    13.50 )-----------( 212      ( 2023 05:30 )             28.2     -    136  |  103  |  6<L>  ----------------------------<  121<H>  4.5   |  20<L>  |  0.43<L>    Ca    8.4      2023 19:19    TPro  4.8<L>  /  Alb  2.7<L>  /  TBili  <0.2  /  DBili  x   /  AST  23  /  ALT  8<L>  /  AlkPhos  75  -            I&O's Summary    RADIOLOGY & ADDITIONAL STUDIES:    EKG:

## 2023-01-18 NOTE — CHART NOTE - NSCHARTNOTEFT_GEN_A_CORE
Patient assessed due to persistent tachycardia. Denies CP, palpitations, pleuritic chest pain. Comfortably moving around the room.    Vital Signs Last 24 Hrs  T(C): 37.2 (18 Jan 2023 14:00), Max: 37.6 (18 Jan 2023 02:05)  T(F): 99 (18 Jan 2023 14:00), Max: 99.7 (18 Jan 2023 02:05)  HR: 119 (18 Jan 2023 14:00) (102 - 139)  BP: 138/87 (18 Jan 2023 14:00) (128/81 - 152/96)  BP(mean): --  RR: 16 (18 Jan 2023 14:00) (16 - 19)  SpO2: 98% (18 Jan 2023 14:00) (95% - 99%)    Parameters below as of 18 Jan 2023 14:00  Patient On (Oxygen Delivery Method): room air      PE:  Gen: Alert, NAD, comfortable in chair  CV: Tachycardic, no murmurs appreciated, regular rythm  Lungs: CTAB  Ext: 1+ edema b/l, no erythema, no warmth, negative norma sign, no calf tenderness    AP  26 yo s/p CS with post-op tachycardia. Will appreciate cardiology consult recs for TTE and continue to monitor closely. No PE findings concerning for DVT at this point.

## 2023-01-18 NOTE — PROGRESS NOTE ADULT - ASSESSMENT
A/P: 27y s/p  section, POD #2, c/b siPEC without severe features  -  siPEC: mild range BPs overnight with tachycardia, EKG sinus tachycardia.  Patient asymptomatic.  Consider starting BP medication, will follow up with attending  -  Pain: PO motrin/acetaminophen, oxycodone for severe pain PRN  -  Post-operatively labs: post-op Hgb stable  -  GI: tolerating regular diet, passing gas  -  : voiding spontaneously  -  DVT prophylaxis: ambulation, SCDs, Lovenox  -  Dispo: POD 3 or 4

## 2023-01-19 LAB
ALBUMIN SERPL ELPH-MCNC: 2.9 G/DL — LOW (ref 3.3–5)
ALP SERPL-CCNC: 75 U/L — SIGNIFICANT CHANGE UP (ref 40–120)
ALT FLD-CCNC: 7 U/L — LOW (ref 10–45)
ANION GAP SERPL CALC-SCNC: 10 MMOL/L — SIGNIFICANT CHANGE UP (ref 5–17)
AST SERPL-CCNC: 12 U/L — SIGNIFICANT CHANGE UP (ref 10–40)
BASOPHILS # BLD AUTO: 0.02 K/UL — SIGNIFICANT CHANGE UP (ref 0–0.2)
BASOPHILS NFR BLD AUTO: 0.2 % — SIGNIFICANT CHANGE UP (ref 0–2)
BILIRUB SERPL-MCNC: <0.2 MG/DL — SIGNIFICANT CHANGE UP (ref 0.2–1.2)
BUN SERPL-MCNC: 7 MG/DL — SIGNIFICANT CHANGE UP (ref 7–23)
CALCIUM SERPL-MCNC: 9.1 MG/DL — SIGNIFICANT CHANGE UP (ref 8.4–10.5)
CHLORIDE SERPL-SCNC: 104 MMOL/L — SIGNIFICANT CHANGE UP (ref 96–108)
CO2 SERPL-SCNC: 25 MMOL/L — SIGNIFICANT CHANGE UP (ref 22–31)
CREAT SERPL-MCNC: 0.57 MG/DL — SIGNIFICANT CHANGE UP (ref 0.5–1.3)
EGFR: 128 ML/MIN/1.73M2 — SIGNIFICANT CHANGE UP
EOSINOPHIL # BLD AUTO: 0.21 K/UL — SIGNIFICANT CHANGE UP (ref 0–0.5)
EOSINOPHIL NFR BLD AUTO: 1.8 % — SIGNIFICANT CHANGE UP (ref 0–6)
GLUCOSE SERPL-MCNC: 127 MG/DL — HIGH (ref 70–99)
HCT VFR BLD CALC: 26.9 % — LOW (ref 34.5–45)
HGB BLD-MCNC: 8.5 G/DL — LOW (ref 11.5–15.5)
IMM GRANULOCYTES NFR BLD AUTO: 0.5 % — SIGNIFICANT CHANGE UP (ref 0–0.9)
LYMPHOCYTES # BLD AUTO: 2.96 K/UL — SIGNIFICANT CHANGE UP (ref 1–3.3)
LYMPHOCYTES # BLD AUTO: 25.7 % — SIGNIFICANT CHANGE UP (ref 13–44)
MCHC RBC-ENTMCNC: 29 PG — SIGNIFICANT CHANGE UP (ref 27–34)
MCHC RBC-ENTMCNC: 31.6 GM/DL — LOW (ref 32–36)
MCV RBC AUTO: 91.8 FL — SIGNIFICANT CHANGE UP (ref 80–100)
MONOCYTES # BLD AUTO: 0.53 K/UL — SIGNIFICANT CHANGE UP (ref 0–0.9)
MONOCYTES NFR BLD AUTO: 4.6 % — SIGNIFICANT CHANGE UP (ref 2–14)
NEUTROPHILS # BLD AUTO: 7.73 K/UL — HIGH (ref 1.8–7.4)
NEUTROPHILS NFR BLD AUTO: 67.2 % — SIGNIFICANT CHANGE UP (ref 43–77)
NRBC # BLD: 0 /100 WBCS — SIGNIFICANT CHANGE UP (ref 0–0)
PLATELET # BLD AUTO: 253 K/UL — SIGNIFICANT CHANGE UP (ref 150–400)
POTASSIUM SERPL-MCNC: 4.3 MMOL/L — SIGNIFICANT CHANGE UP (ref 3.5–5.3)
POTASSIUM SERPL-SCNC: 4.3 MMOL/L — SIGNIFICANT CHANGE UP (ref 3.5–5.3)
PROT SERPL-MCNC: 5.9 G/DL — LOW (ref 6–8.3)
RBC # BLD: 2.93 M/UL — LOW (ref 3.8–5.2)
RBC # FLD: 14.6 % — HIGH (ref 10.3–14.5)
SODIUM SERPL-SCNC: 139 MMOL/L — SIGNIFICANT CHANGE UP (ref 135–145)
SURGICAL PATHOLOGY STUDY: SIGNIFICANT CHANGE UP
WBC # BLD: 11.51 K/UL — HIGH (ref 3.8–10.5)
WBC # FLD AUTO: 11.51 K/UL — HIGH (ref 3.8–10.5)

## 2023-01-19 PROCEDURE — 93306 TTE W/DOPPLER COMPLETE: CPT | Mod: 26

## 2023-01-19 PROCEDURE — 99233 SBSQ HOSP IP/OBS HIGH 50: CPT

## 2023-01-19 RX ORDER — LABETALOL HCL 100 MG
200 TABLET ORAL EVERY 12 HOURS
Refills: 0 | Status: DISCONTINUED | OUTPATIENT
Start: 2023-01-19 | End: 2023-01-20

## 2023-01-19 RX ADMIN — Medication 200 MILLIGRAM(S): at 21:08

## 2023-01-19 RX ADMIN — SENNA PLUS 1 TABLET(S): 8.6 TABLET ORAL at 11:49

## 2023-01-19 RX ADMIN — Medication 100 MILLIGRAM(S): at 09:03

## 2023-01-19 RX ADMIN — Medication 975 MILLIGRAM(S): at 02:50

## 2023-01-19 RX ADMIN — OXYCODONE HYDROCHLORIDE 5 MILLIGRAM(S): 5 TABLET ORAL at 07:20

## 2023-01-19 RX ADMIN — Medication 975 MILLIGRAM(S): at 09:02

## 2023-01-19 RX ADMIN — Medication 975 MILLIGRAM(S): at 22:08

## 2023-01-19 RX ADMIN — ENOXAPARIN SODIUM 40 MILLIGRAM(S): 100 INJECTION SUBCUTANEOUS at 11:49

## 2023-01-19 RX ADMIN — OXYCODONE HYDROCHLORIDE 5 MILLIGRAM(S): 5 TABLET ORAL at 17:11

## 2023-01-19 RX ADMIN — OXYCODONE HYDROCHLORIDE 5 MILLIGRAM(S): 5 TABLET ORAL at 11:49

## 2023-01-19 RX ADMIN — OXYCODONE HYDROCHLORIDE 5 MILLIGRAM(S): 5 TABLET ORAL at 06:47

## 2023-01-19 RX ADMIN — Medication 975 MILLIGRAM(S): at 21:08

## 2023-01-19 RX ADMIN — OXYCODONE HYDROCHLORIDE 5 MILLIGRAM(S): 5 TABLET ORAL at 12:30

## 2023-01-19 RX ADMIN — OXYCODONE HYDROCHLORIDE 5 MILLIGRAM(S): 5 TABLET ORAL at 18:00

## 2023-01-19 RX ADMIN — Medication 975 MILLIGRAM(S): at 03:20

## 2023-01-19 RX ADMIN — OXYCODONE HYDROCHLORIDE 5 MILLIGRAM(S): 5 TABLET ORAL at 00:00

## 2023-01-19 NOTE — PROGRESS NOTE ADULT - SUBJECTIVE AND OBJECTIVE BOX
Cardiology Consult Note    INTERVAL EVENTS:  TELEMETRY:    PAST MEDICAL & SURGICAL HISTORY:      MEDICATIONS  (STANDING):  acetaminophen     Tablet .. 975 milliGRAM(s) Oral <User Schedule>  bisacodyl Suppository 10 milliGRAM(s) Rectal once  diphtheria/tetanus/pertussis (acellular) Vaccine (Adacel) 0.5 milliLiter(s) IntraMuscular once  enoxaparin Injectable 40 milliGRAM(s) SubCutaneous every 24 hours  influenza   Vaccine 0.5 milliLiter(s) IntraMuscular once  labetalol 100 milliGRAM(s) Oral two times a day  lactated ringers. 1000 milliLiter(s) (125 mL/Hr) IV Continuous <Continuous>  oxytocin Infusion 333.333 milliUNIT(s)/Min (1000 mL/Hr) IV Continuous <Continuous>  senna 1 Tablet(s) Oral daily    MEDICATIONS  (PRN):  diphenhydrAMINE 25 milliGRAM(s) Oral every 6 hours PRN Pruritus  lanolin Ointment 1 Application(s) Topical every 6 hours PRN Sore Nipples  magnesium hydroxide Suspension 30 milliLiter(s) Oral two times a day PRN Constipation  oxyCODONE    IR 5 milliGRAM(s) Oral once PRN Moderate to Severe Pain (4-10)  oxyCODONE    IR 5 milliGRAM(s) Oral every 3 hours PRN Moderate to Severe Pain (4-10)  simethicone 80 milliGRAM(s) Chew every 4 hours PRN Gas    Vital Signs Last 24 Hrs  T(C): 36.4 (19 Jan 2023 08:53), Max: 37.3 (18 Jan 2023 18:00)  T(F): 97.5 (19 Jan 2023 08:53), Max: 99.2 (18 Jan 2023 18:00)  HR: 100 (19 Jan 2023 08:53) (96 - 113)  BP: 137/91 (19 Jan 2023 08:53) (128/79 - 137/91)  BP(mean): --  RR: 16 (19 Jan 2023 08:53) (16 - 16)  SpO2: 98% (19 Jan 2023 08:53) (97% - 98%)    Parameters below as of 19 Jan 2023 06:39  Patient On (Oxygen Delivery Method): room air        PHYSICAL EXAM:  GEN: Awake, alert. NAD.   HEENT: NCAT, PERRL, EOMI. Mucosa moist. No JVD.  RESP: CTA b/l  CV: RRR. Normal S1/S2. No m/r/g.  ABD: Soft. NT/ND. BS+  EXT: Warm. No edema, clubbing, or cyanosis.   NEURO: AAOx3. No focal deficits.     LABS:                  I&O's Summary    RADIOLOGY & ADDITIONAL STUDIES:    EKG:         Cardiology Consult Note    INTERVAL EVENTS:  No complaints    PAST MEDICAL & SURGICAL HISTORY:      MEDICATIONS  (STANDING):  acetaminophen     Tablet .. 975 milliGRAM(s) Oral <User Schedule>  bisacodyl Suppository 10 milliGRAM(s) Rectal once  diphtheria/tetanus/pertussis (acellular) Vaccine (Adacel) 0.5 milliLiter(s) IntraMuscular once  enoxaparin Injectable 40 milliGRAM(s) SubCutaneous every 24 hours  influenza   Vaccine 0.5 milliLiter(s) IntraMuscular once  labetalol 100 milliGRAM(s) Oral two times a day  lactated ringers. 1000 milliLiter(s) (125 mL/Hr) IV Continuous <Continuous>  oxytocin Infusion 333.333 milliUNIT(s)/Min (1000 mL/Hr) IV Continuous <Continuous>  senna 1 Tablet(s) Oral daily    MEDICATIONS  (PRN):  diphenhydrAMINE 25 milliGRAM(s) Oral every 6 hours PRN Pruritus  lanolin Ointment 1 Application(s) Topical every 6 hours PRN Sore Nipples  magnesium hydroxide Suspension 30 milliLiter(s) Oral two times a day PRN Constipation  oxyCODONE    IR 5 milliGRAM(s) Oral once PRN Moderate to Severe Pain (4-10)  oxyCODONE    IR 5 milliGRAM(s) Oral every 3 hours PRN Moderate to Severe Pain (4-10)  simethicone 80 milliGRAM(s) Chew every 4 hours PRN Gas    Vital Signs Last 24 Hrs  T(C): 36.4 (19 Jan 2023 08:53), Max: 37.3 (18 Jan 2023 18:00)  T(F): 97.5 (19 Jan 2023 08:53), Max: 99.2 (18 Jan 2023 18:00)  HR: 100 (19 Jan 2023 08:53) (96 - 113)  BP: 137/91 (19 Jan 2023 08:53) (128/79 - 137/91)  BP(mean): --  RR: 16 (19 Jan 2023 08:53) (16 - 16)  SpO2: 98% (19 Jan 2023 08:53) (97% - 98%)    Parameters below as of 19 Jan 2023 06:39  Patient On (Oxygen Delivery Method): room air        PHYSICAL EXAM:  GEN: Awake, alert. NAD.   HEENT:  JVP normal  RESP: CTA b/l  CV: RRR. Normal S1/S2. No m/r/g.  EXT: Warm. No edema  NEURO: AAOx3. No focal deficits.       LABS:                  I&O's Summary    RADIOLOGY & ADDITIONAL STUDIES:    EKG:

## 2023-01-19 NOTE — PROGRESS NOTE ADULT - ASSESSMENT
ASSESSMENT:  27F  Hx of single kidney, recently diagnosed with hypertension and gestational diabetes during pregnancy presented for , course complicated by superimposed preeclampsia without severe features. Cardiology consulted in setting of sinus tachycardia and pre-eclampsia.       PLAN:  Remains euvolemic  - Increase labetolol to 200mg BID  - Pain control per primary team  - Monitor electrolytes to maintain K>4 and Mg>2       ASSESSMENT:  27F  Hx of single kidney, recently diagnosed with hypertension and gestational diabetes during pregnancy presented for , course complicated by superimposed preeclampsia without severe features. Cardiology consulted in setting of sinus tachycardia and pre-eclampsia.       PLAN:  Remains euvolemic, no complaints.  - Increase labetolol to 200mg BID  - Pain control per primary team  - Monitor electrolytes to maintain K>4 and Mg>2  - Set patient up with Dr. Fermin for follow up upon discharge: [address], [facility] #9 [phone]      Discussed with Dr. Escalona, consult attending. Cardiology will sign off at this time and reconsult for any new clinical changes/questions.     ASSESSMENT:  27F  Hx of single kidney, recently diagnosed with hypertension and gestational diabetes during pregnancy presented for , course complicated by superimposed preeclampsia without severe features. Cardiology consulted in setting of sinus tachycardia and pre-eclampsia.     Cardiac Studies:  TTE (23): Normal LV/RV function, no significant valvular disease, no effusion.    PLAN:  Remains euvolemic, no complaints.  - Increase labetolol to 200mg BID  - Pain control per primary team  - Monitor electrolytes to maintain K>4 and Mg>2  - Set patient up with Dr. Fermin for follow up upon discharge: [address] [facility] #9 [phone]      Discussed with Dr. Escalona, consult attending. Cardiology will sign off at this time and reconsult for any new clinical changes/questions.

## 2023-01-19 NOTE — PROGRESS NOTE ADULT - ATTENDING COMMENTS
Initial attending contact date 23     . See fellow note written above for details. I reviewed the fellow documentation. I have personally seen and examined this patient. I reviewed vitals, labs, medications, cardiac studies, and additional imaging. I agree with the above fellow's findings and plans as written above with the following additions/statements.    27F  Hx of single kidney, recently diagnosed with hypertension and gestational diabetes during pregnancy presented for , course complicated by superimposed preeclampsia without severe features. Cardiology consulted in setting of sinus tachycardia and pre-eclampsia.     Cardiac Studies:  TTE (23): Normal LV/RV function, no significant valvular disease, no effusion.    PLAN:  Remains euvolemic, no complaints.  - Increase labetolol to 200mg BID  - Pain control per primary team  - Monitor electrolytes to maintain K>4 and Mg>2  - Set patient up with Dr. Fermin for follow up upon discharge: [address] [facility] #9 [phone]

## 2023-01-19 NOTE — PROGRESS NOTE ADULT - ASSESSMENT
A/P: 27y POD3 s/p  section. Post op course c/b tachycardia. Pt is hemodynamically stable this morning   -  Neuro-Pain control with motrin/acetaminophen, oxycodone for severe pain PRN  -  Cardio: Tachycardia noted. Full work up pending. Cardiology consulted. BLE dopplers WNL. TTE and CTA pending.   -  GI: Reg diet, Reglan/Zofran prn   -  : s/p [place]- voiding spontaneously.   -  DVT prophylaxis: Lovenox 40mg qd, SCDs  -  Activity- ambulate as tolerated   -  Heme: post-op hemoglobin stable. CBC And CMP pending due to maternal tachycardia and hx of renal agenesis     Plan discussed w/ Dr. Tamez

## 2023-01-19 NOTE — CHART NOTE - NSCHARTNOTEFT_GEN_A_CORE
Received call that patient was currently at CTA and had hesitations about getting the scan. Discussed with patient her concerns - pt states that she feels very well, has no SOB, chest pain, lightheadedness/dizziness. Pt does not want to get the contrast given her hx of renal agenesis. She states that every time her vitals had been taken it was after she was ambulating and was in pain. Most recent HR 96 with a normal TTE and negative LE dopplers. Patient understanding of risk of PE in the postpartum/postop period and understands the dangers of an undiagnosed PE, including risk of death. Pt continues to decline the scan, agreeing to stay overnight for continued observation. Dr. Tamez aware

## 2023-01-19 NOTE — PROGRESS NOTE ADULT - SUBJECTIVE AND OBJECTIVE BOX
Patient seen and evaluated at bedside. Pt states she has mild abdominal pain, overall controlled with pain medication.   She is tolerating reg diet, passing flatus, and voiding. Pt is breastfeeding. Pt reports full work up for tachycardia which she   She denies headache, dizziness, chest pain, palpitations, shortness of breath, nausea, vomiting, or heavy vaginal bleeding.    Physical Exam:  Vital Signs Last 24 Hrs  T(C): 36.8 (19 Jan 2023 06:39), Max: 37.3 (18 Jan 2023 18:00)  T(F): 98.3 (19 Jan 2023 06:39), Max: 99.2 (18 Jan 2023 18:00)  HR: 96 (19 Jan 2023 06:39) (96 - 125)  BP: 134/93 (19 Jan 2023 06:39) (128/79 - 138/87)  BP(mean): --  RR: 16 (19 Jan 2023 06:39) (16 - 18)  SpO2: 98% (19 Jan 2023 06:39) (95% - 98%)    Parameters below as of 19 Jan 2023 06:39  Patient On (Oxygen Delivery Method): room air      GA: comfortable in NAD  Abd: soft, nontender, nondistended, no rebound or guarding, incision clean, dry and intact, uterus firm at midline, 2 fb below umbilicus  : lochia WNL  Extremities: no swelling or calf tenderness, SCDs in place      acetaminophen     Tablet .. 975 milliGRAM(s) Oral <User Schedule>  diphenhydrAMINE 25 milliGRAM(s) Oral every 6 hours PRN  diphtheria/tetanus/pertussis (acellular) Vaccine (Adacel) 0.5 milliLiter(s) IntraMuscular once  enoxaparin Injectable 40 milliGRAM(s) SubCutaneous every 24 hours  influenza   Vaccine 0.5 milliLiter(s) IntraMuscular once  labetalol 100 milliGRAM(s) Oral two times a day  lactated ringers. 1000 milliLiter(s) IV Continuous <Continuous>  lanolin Ointment 1 Application(s) Topical every 6 hours PRN  magnesium hydroxide Suspension 30 milliLiter(s) Oral two times a day PRN  oxyCODONE    IR 5 milliGRAM(s) Oral once PRN  oxyCODONE    IR 5 milliGRAM(s) Oral every 3 hours PRN  oxytocin Infusion 333.333 milliUNIT(s)/Min IV Continuous <Continuous>  senna 1 Tablet(s) Oral daily  simethicone 80 milliGRAM(s) Chew every 4 hours PRN

## 2023-01-20 ENCOUNTER — TRANSCRIPTION ENCOUNTER (OUTPATIENT)
Age: 28
End: 2023-01-20

## 2023-01-20 ENCOUNTER — NON-APPOINTMENT (OUTPATIENT)
Age: 28
End: 2023-01-20

## 2023-01-20 VITALS
DIASTOLIC BLOOD PRESSURE: 89 MMHG | TEMPERATURE: 98 F | SYSTOLIC BLOOD PRESSURE: 133 MMHG | OXYGEN SATURATION: 96 % | RESPIRATION RATE: 18 BRPM | HEART RATE: 109 BPM

## 2023-01-20 LAB
ALBUMIN SERPL ELPH-MCNC: 3 G/DL — LOW (ref 3.3–5)
ALP SERPL-CCNC: 78 U/L — SIGNIFICANT CHANGE UP (ref 40–120)
ALT FLD-CCNC: 8 U/L — LOW (ref 10–45)
ANION GAP SERPL CALC-SCNC: 11 MMOL/L — SIGNIFICANT CHANGE UP (ref 5–17)
AST SERPL-CCNC: 11 U/L — SIGNIFICANT CHANGE UP (ref 10–40)
BILIRUB SERPL-MCNC: <0.2 MG/DL — SIGNIFICANT CHANGE UP (ref 0.2–1.2)
BUN SERPL-MCNC: 8 MG/DL — SIGNIFICANT CHANGE UP (ref 7–23)
CALCIUM SERPL-MCNC: 9 MG/DL — SIGNIFICANT CHANGE UP (ref 8.4–10.5)
CHLORIDE SERPL-SCNC: 102 MMOL/L — SIGNIFICANT CHANGE UP (ref 96–108)
CO2 SERPL-SCNC: 25 MMOL/L — SIGNIFICANT CHANGE UP (ref 22–31)
CREAT SERPL-MCNC: 0.62 MG/DL — SIGNIFICANT CHANGE UP (ref 0.5–1.3)
EGFR: 125 ML/MIN/1.73M2 — SIGNIFICANT CHANGE UP
GLUCOSE SERPL-MCNC: 82 MG/DL — SIGNIFICANT CHANGE UP (ref 70–99)
HCT VFR BLD CALC: 26.2 % — LOW (ref 34.5–45)
HGB BLD-MCNC: 8.1 G/DL — LOW (ref 11.5–15.5)
MCHC RBC-ENTMCNC: 28.6 PG — SIGNIFICANT CHANGE UP (ref 27–34)
MCHC RBC-ENTMCNC: 30.9 GM/DL — LOW (ref 32–36)
MCV RBC AUTO: 92.6 FL — SIGNIFICANT CHANGE UP (ref 80–100)
NRBC # BLD: 0 /100 WBCS — SIGNIFICANT CHANGE UP (ref 0–0)
PLATELET # BLD AUTO: 270 K/UL — SIGNIFICANT CHANGE UP (ref 150–400)
POTASSIUM SERPL-MCNC: 4.2 MMOL/L — SIGNIFICANT CHANGE UP (ref 3.5–5.3)
POTASSIUM SERPL-SCNC: 4.2 MMOL/L — SIGNIFICANT CHANGE UP (ref 3.5–5.3)
PROT SERPL-MCNC: 6.2 G/DL — SIGNIFICANT CHANGE UP (ref 6–8.3)
RBC # BLD: 2.83 M/UL — LOW (ref 3.8–5.2)
RBC # FLD: 14.6 % — HIGH (ref 10.3–14.5)
SODIUM SERPL-SCNC: 138 MMOL/L — SIGNIFICANT CHANGE UP (ref 135–145)
WBC # BLD: 9.74 K/UL — SIGNIFICANT CHANGE UP (ref 3.8–10.5)
WBC # FLD AUTO: 9.74 K/UL — SIGNIFICANT CHANGE UP (ref 3.8–10.5)

## 2023-01-20 PROCEDURE — 82570 ASSAY OF URINE CREATININE: CPT

## 2023-01-20 PROCEDURE — 83615 LACTATE (LD) (LDH) ENZYME: CPT

## 2023-01-20 PROCEDURE — 86901 BLOOD TYPING SEROLOGIC RH(D): CPT

## 2023-01-20 PROCEDURE — 59050 FETAL MONITOR W/REPORT: CPT

## 2023-01-20 PROCEDURE — 85027 COMPLETE CBC AUTOMATED: CPT

## 2023-01-20 PROCEDURE — 84156 ASSAY OF PROTEIN URINE: CPT

## 2023-01-20 PROCEDURE — 86900 BLOOD TYPING SEROLOGIC ABO: CPT

## 2023-01-20 PROCEDURE — 59514 CESAREAN DELIVERY ONLY: CPT | Mod: AS

## 2023-01-20 PROCEDURE — 85025 COMPLETE CBC W/AUTO DIFF WBC: CPT

## 2023-01-20 PROCEDURE — 86850 RBC ANTIBODY SCREEN: CPT

## 2023-01-20 PROCEDURE — 93306 TTE W/DOPPLER COMPLETE: CPT

## 2023-01-20 PROCEDURE — 84550 ASSAY OF BLOOD/URIC ACID: CPT

## 2023-01-20 PROCEDURE — 93970 EXTREMITY STUDY: CPT

## 2023-01-20 PROCEDURE — 36415 COLL VENOUS BLD VENIPUNCTURE: CPT

## 2023-01-20 PROCEDURE — 88307 TISSUE EXAM BY PATHOLOGIST: CPT

## 2023-01-20 PROCEDURE — 82962 GLUCOSE BLOOD TEST: CPT

## 2023-01-20 PROCEDURE — 80053 COMPREHEN METABOLIC PANEL: CPT

## 2023-01-20 RX ORDER — LABETALOL HCL 100 MG
1 TABLET ORAL
Qty: 0 | Refills: 0 | DISCHARGE
Start: 2023-01-20

## 2023-01-20 RX ORDER — ACETAMINOPHEN 500 MG
3 TABLET ORAL
Qty: 0 | Refills: 0 | DISCHARGE
Start: 2023-01-20

## 2023-01-20 RX ADMIN — Medication 200 MILLIGRAM(S): at 09:40

## 2023-01-20 RX ADMIN — OXYCODONE HYDROCHLORIDE 5 MILLIGRAM(S): 5 TABLET ORAL at 00:17

## 2023-01-20 RX ADMIN — Medication 975 MILLIGRAM(S): at 03:00

## 2023-01-20 RX ADMIN — OXYCODONE HYDROCHLORIDE 5 MILLIGRAM(S): 5 TABLET ORAL at 00:47

## 2023-01-20 RX ADMIN — OXYCODONE HYDROCHLORIDE 5 MILLIGRAM(S): 5 TABLET ORAL at 07:36

## 2023-01-20 RX ADMIN — Medication 975 MILLIGRAM(S): at 10:10

## 2023-01-20 RX ADMIN — OXYCODONE HYDROCHLORIDE 5 MILLIGRAM(S): 5 TABLET ORAL at 08:04

## 2023-01-20 RX ADMIN — Medication 975 MILLIGRAM(S): at 02:37

## 2023-01-20 RX ADMIN — ENOXAPARIN SODIUM 40 MILLIGRAM(S): 100 INJECTION SUBCUTANEOUS at 09:39

## 2023-01-20 RX ADMIN — Medication 975 MILLIGRAM(S): at 09:40

## 2023-01-20 RX ADMIN — OXYCODONE HYDROCHLORIDE 5 MILLIGRAM(S): 5 TABLET ORAL at 11:43

## 2023-01-20 NOTE — DISCHARGE NOTE OB - HOSPITAL COURSE
Patient had uncomplicated planned low transverse  section at request, pregnancy complicated by hypertension and GDMA1. Please see operative note for details.  During postpartum course patient's vitals were stable, vaginal bleeding appropriate, and pain well controlled.  Post operation day one hematocrit was appropriate. Patient then had elevated blood pressures and was started on medication with appropriate response. On day of discharge patient was ambulating, her pain controlled with oral medications, had adequate oral intake, and was voiding freely.  Discharge instructions and precautions were given.  Will return to clinic for blood pressure check.

## 2023-01-20 NOTE — DISCHARGE NOTE OB - IF YOU ARE A SMOKER, IT IS IMPORTANT FOR YOUR HEALTH TO STOP SMOKING. PLEASE BE AWARE THAT SECOND HAND SMOKE IS ALSO HARMFUL.
verbal report given to district nurse on [address]. wound culture of groin puncture site sent. siva leg dsgs intact.district nurse made aware of sacral decubuti. med lock to left AC IV site intact. no signs or symptoms of IV complications. pt med x1 for temp 100.9 rectally. tp transfer via stretcher to [address] with belongings.
Statement Selected

## 2023-01-20 NOTE — DISCHARGE NOTE OB - CARE PLAN
1 Principal Discharge DX:	 delivery delivered  Assessment and plan of treatment:	After discharge, please stay on pelvic rest for 6 weeks, meaning no sexual intercourse, no tampons and no douching.  No driving for 2 weeks as women can loose a lot of blood during delivery and there is a possibility of being lightheaded/fainting.  No lifting objects heavier than baby for two weeks.  Expect to have vaginal bleeding/spotting for up to six weeks.  The bleeding should get lighter and more white/light brown with time.  For bleeding soaking more than a pad an hour or passing clots greater than the size of your fist, come in to the emergency department.    Follow up in clinic in 2 weeks for incision check.  Call clinic for noticeable increase in redness or swelling at incision, discharge from incision, or opening of skin at incision site.      Monitor your blood pressure 2 times a day. Call your doctor for values >160/110. Take your labetalol as prescribed. Call your doctor for any signs of preeclampsia, such  as headaches, visual changes, right upper quadrant pain, excessive swelling.

## 2023-01-20 NOTE — DISCHARGE NOTE OB - MEDICATION SUMMARY - MEDICATIONS TO TAKE
I will START or STAY ON the medications listed below when I get home from the hospital:    acetaminophen 325 mg oral tablet  -- 3 tab(s) by mouth every 6 hours, As Needed  -- Indication: For for pain    labetalol 200 mg oral tablet  -- 1 tab(s) by mouth every 12 hours  -- Indication: For for blood pressure

## 2023-01-20 NOTE — DISCHARGE NOTE OB - MATERIALS PROVIDED
[facility] Schulter Screening Program/Schulter  Immunization Record/Breastfeeding Log/Bottle Feeding Log/Breastfeeding Mother’s Support Group Information/Guide to Postpartum Care/[facility] Hearing Screen Program/Back To Sleep Handout/Shaken Baby Prevention Handout/Birth Certificate Instructions

## 2023-01-20 NOTE — PROGRESS NOTE ADULT - ASSESSMENT
A/P 27yF s/p , POD #4, stable  -  siPEC w/o SF: Normotensive to mild range and asymptomatic. Will continue to monitor VSq4h.   -  Pain: PO motrin & tylenol, oxycodone available for severe pain PRN  -  Post-operatively labs: post-op Hgb , hemodynamically stable, no symptoms of anemia   -  Pulm: encourage ISS use  -  GI: regulars, passing gas  -  : voiding  -  DVT prophylaxis: ambulation, SCDs, lovenox  -  Dispo: POD 3 or 4 A/P 27yF s/p , POD #4, stable  -  siPEC w/o SF: Normotensive to mild range and asymptomatic. Will continue to monitor VSq4h.   -  Tachycardia: Cardiology consulted for tachycardia, f/u recs, currently on labetalol 200BID  -  Pain: PO motrin & tylenol, oxycodone available for severe pain PRN  -  Post-operatively labs: post-op Hgb 8.5, hemodynamically stable, no symptoms of anemia   -  Pulm: encourage ISS use  -  GI: regulars, passing gas  -  : voiding  -  DVT prophylaxis: ambulation, SCDs, lovenox  -  Dispo: POD 3 or 4

## 2023-01-20 NOTE — DISCHARGE NOTE OB - CARE PROVIDER_API CALL
Balaji Tamez)  Obstetrics and Gynecology  [address] [place], [place] [id number]  Phone: [phone]  Fax: [phone]  Follow Up Time:

## 2023-01-20 NOTE — DISCHARGE NOTE OB - PATIENT PORTAL LINK FT
You can access the FollowMyHealth Patient Portal offered by [facility] by registering at the following website: [email or web address]. By joining [facility]’s FollowMyHealth portal, you will also be able to view your health information using other applications (apps) compatible with our system.

## 2023-01-20 NOTE — DISCHARGE NOTE OB - PLAN OF CARE
After discharge, please stay on pelvic rest for 6 weeks, meaning no sexual intercourse, no tampons and no douching.  No driving for 2 weeks as women can loose a lot of blood during delivery and there is a possibility of being lightheaded/fainting.  No lifting objects heavier than baby for two weeks.  Expect to have vaginal bleeding/spotting for up to six weeks.  The bleeding should get lighter and more white/light brown with time.  For bleeding soaking more than a pad an hour or passing clots greater than the size of your fist, come in to the emergency department.    Follow up in clinic in 2 weeks for incision check.  Call clinic for noticeable increase in redness or swelling at incision, discharge from incision, or opening of skin at incision site.      Monitor your blood pressure 2 times a day. Call your doctor for values >160/110. Take your labetalol as prescribed. Call your doctor for any signs of preeclampsia, such  as headaches, visual changes, right upper quadrant pain, excessive swelling.

## 2023-01-20 NOTE — PROGRESS NOTE ADULT - SUBJECTIVE AND OBJECTIVE BOX
Patient evaluated at bedside this morning, resting comfortable in bed, with no acute events overnight. Reports pain is well controlled. She denies headache, dizziness, CP, palpitations, SOB, N/V, or heavy vaginal bleeding.  Meeting all milestones: eating/drinking, voiding, ambulating +flatus.     Physical Exam:  Vital Signs Last 24 Hrs  T(C): 37 (19 Jan 2023 23:00), Max: 37.3 (19 Jan 2023 18:56)  T(F): 98.6 (19 Jan 2023 23:00), Max: 99.2 (19 Jan 2023 18:56)  HR: 103 (20 Jan 2023 03:30) (96 - 109)  BP: 122/77 (20 Jan 2023 03:30) (113/78 - 141/82)  RR: 16 (20 Jan 2023 03:30) (16 - 16)  SpO2: 98% (20 Jan 2023 03:30) (97% - 99%)    Parameters below as of 19 Jan 2023 14:00  Patient On (Oxygen Delivery Method): room air    Gen: NAD, A&0 x 3  Pulm: no incr. WOB  Abd: soft, nontender, mildly distended, no rebound or guarding, incision clean, dry and intact, uterus firm at midline, 2 fb below umbilicus  : voiding, lochia WNL  Extremities: no swelling or calf tenderness, SCDs in place                        8.5    11.51 )-----------( 253      ( 19 Jan 2023 15:23 )             26.9     01-19    139  |  104  |  7   ----------------------------<  127<H>  4.3   |  25  |  0.57    Ca    9.1      19 Jan 2023 15:23    TPro  5.9<L>  /  Alb  2.9<L>  /  TBili  <0.2  /  DBili  x   /  AST  12  /  ALT  7<L>  /  AlkPhos  75  01-19        acetaminophen     Tablet .. 975 milliGRAM(s) Oral <User Schedule>  bisacodyl Suppository 10 milliGRAM(s) Rectal once  diphenhydrAMINE 25 milliGRAM(s) Oral every 6 hours PRN  diphtheria/tetanus/pertussis (acellular) Vaccine (Adacel) 0.5 milliLiter(s) IntraMuscular once  enoxaparin Injectable 40 milliGRAM(s) SubCutaneous every 24 hours  influenza   Vaccine 0.5 milliLiter(s) IntraMuscular once  labetalol 200 milliGRAM(s) Oral every 12 hours  lactated ringers. 1000 milliLiter(s) IV Continuous <Continuous>  lanolin Ointment 1 Application(s) Topical every 6 hours PRN  magnesium hydroxide Suspension 30 milliLiter(s) Oral two times a day PRN  oxyCODONE    IR 5 milliGRAM(s) Oral once PRN  oxyCODONE    IR 5 milliGRAM(s) Oral every 3 hours PRN  oxytocin Infusion 333.333 milliUNIT(s)/Min IV Continuous <Continuous>  senna 1 Tablet(s) Oral daily  simethicone 80 milliGRAM(s) Chew every 4 hours PRN

## 2023-01-24 DIAGNOSIS — Z28.21 IMMUNIZATION NOT CARRIED OUT BECAUSE OF PATIENT REFUSAL: ICD-10-CM

## 2023-01-24 DIAGNOSIS — Z3A.37 37 WEEKS GESTATION OF PREGNANCY: ICD-10-CM

## 2023-01-24 DIAGNOSIS — Q60.0 RENAL AGENESIS, UNILATERAL: ICD-10-CM

## 2023-01-24 DIAGNOSIS — Z71.85 ENCOUNTER FOR IMMUNIZATION SAFETY COUNSELING: ICD-10-CM

## 2023-01-24 DIAGNOSIS — Z28.09 IMMUNIZATION NOT CARRIED OUT BECAUSE OF OTHER CONTRAINDICATION: ICD-10-CM

## 2023-01-24 DIAGNOSIS — R00.0 TACHYCARDIA, UNSPECIFIED: ICD-10-CM

## 2023-01-24 DIAGNOSIS — O32.0XX0 MATERNAL CARE FOR UNSTABLE LIE, NOT APPLICABLE OR UNSPECIFIED: ICD-10-CM

## 2023-01-24 DIAGNOSIS — Z79.82 LONG TERM (CURRENT) USE OF ASPIRIN: ICD-10-CM
